# Patient Record
Sex: MALE | Race: BLACK OR AFRICAN AMERICAN | NOT HISPANIC OR LATINO | Employment: UNEMPLOYED | URBAN - METROPOLITAN AREA
[De-identification: names, ages, dates, MRNs, and addresses within clinical notes are randomized per-mention and may not be internally consistent; named-entity substitution may affect disease eponyms.]

---
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
## 2021-07-12 ENCOUNTER — NURSE TRIAGE (OUTPATIENT)
Dept: OTHER | Facility: OTHER | Age: 21
End: 2021-07-12

## 2021-07-12 DIAGNOSIS — Z20.822 ENCOUNTER FOR SCREENING LABORATORY TESTING FOR COVID-19 VIRUS: Primary | ICD-10-CM

## 2021-07-12 NOTE — TELEPHONE ENCOUNTER
Regarding: COVID TEST FOR TRAVEL  ----- Message from Deborah You sent at 7/12/2021 11:19 AM EDT -----  "I need to be tested due to travel"  Aware of the fee and requirement to create the mychart

## 2021-07-12 NOTE — TELEPHONE ENCOUNTER
Patient is requesting a COVID test for travel  Order placed  Patient informed of testing site, location, and hours of operation  Patient has an active MyChart to review results

## 2022-03-15 ENCOUNTER — HOSPITAL ENCOUNTER (EMERGENCY)
Facility: HOSPITAL | Age: 22
Discharge: HOME | End: 2022-03-15
Attending: EMERGENCY MEDICINE | Admitting: EMERGENCY MEDICINE
Payer: COMMERCIAL

## 2022-03-15 ENCOUNTER — APPOINTMENT (EMERGENCY)
Dept: RADIOLOGY | Facility: HOSPITAL | Age: 22
End: 2022-03-15
Payer: COMMERCIAL

## 2022-03-15 VITALS
RESPIRATION RATE: 20 BRPM | TEMPERATURE: 98.1 F | OXYGEN SATURATION: 100 % | HEART RATE: 101 BPM | SYSTOLIC BLOOD PRESSURE: 154 MMHG | DIASTOLIC BLOOD PRESSURE: 86 MMHG

## 2022-03-15 DIAGNOSIS — R10.9 ABDOMINAL PAIN: ICD-10-CM

## 2022-03-15 DIAGNOSIS — R42 DIZZINESS: Primary | ICD-10-CM

## 2022-03-15 LAB
ALBUMIN SERPL BCP-MCNC: 4.5 G/DL (ref 3.5–5)
ALP SERPL-CCNC: 82 U/L (ref 34–104)
ALT SERPL W P-5'-P-CCNC: 87 U/L (ref 7–52)
ANION GAP SERPL CALCULATED.3IONS-SCNC: 6 MMOL/L (ref 4–13)
AST SERPL W P-5'-P-CCNC: 57 U/L (ref 13–39)
ATRIAL RATE: 109 BPM
BASOPHILS # BLD AUTO: 0.04 THOUSANDS/ΜL (ref 0–0.1)
BASOPHILS NFR BLD AUTO: 1 % (ref 0–1)
BILIRUB SERPL-MCNC: 0.5 MG/DL (ref 0.2–1)
BNP SERPL-MCNC: 17 PG/ML (ref 1–100)
BUN SERPL-MCNC: 13 MG/DL (ref 5–25)
CALCIUM SERPL-MCNC: 9.6 MG/DL (ref 8.4–10.2)
CHLORIDE SERPL-SCNC: 102 MMOL/L (ref 96–108)
CO2 SERPL-SCNC: 28 MMOL/L (ref 21–32)
CREAT SERPL-MCNC: 0.89 MG/DL (ref 0.6–1.3)
D DIMER PPP FEU-MCNC: <0.27 UG/ML FEU
EOSINOPHIL # BLD AUTO: 0.14 THOUSAND/ΜL (ref 0–0.61)
EOSINOPHIL NFR BLD AUTO: 2 % (ref 0–6)
ERYTHROCYTE [DISTWIDTH] IN BLOOD BY AUTOMATED COUNT: 12 % (ref 11.6–15.1)
GFR SERPL CREATININE-BSD FRML MDRD: 122 ML/MIN/1.73SQ M
GLUCOSE SERPL-MCNC: 112 MG/DL (ref 65–140)
HCT VFR BLD AUTO: 43.8 % (ref 36.5–49.3)
HGB BLD-MCNC: 13.9 G/DL (ref 12–17)
IMM GRANULOCYTES # BLD AUTO: 0.01 THOUSAND/UL (ref 0–0.2)
IMM GRANULOCYTES NFR BLD AUTO: 0 % (ref 0–2)
LIPASE SERPL-CCNC: 14 U/L (ref 11–82)
LYMPHOCYTES # BLD AUTO: 3.31 THOUSANDS/ΜL (ref 0.6–4.47)
LYMPHOCYTES NFR BLD AUTO: 58 % (ref 14–44)
MCH RBC QN AUTO: 29 PG (ref 26.8–34.3)
MCHC RBC AUTO-ENTMCNC: 31.7 G/DL (ref 31.4–37.4)
MCV RBC AUTO: 91 FL (ref 82–98)
MONOCYTES # BLD AUTO: 0.78 THOUSAND/ΜL (ref 0.17–1.22)
MONOCYTES NFR BLD AUTO: 13 % (ref 4–12)
NEUTROPHILS # BLD AUTO: 1.52 THOUSANDS/ΜL (ref 1.85–7.62)
NEUTS SEG NFR BLD AUTO: 26 % (ref 43–75)
NRBC BLD AUTO-RTO: 0 /100 WBCS
P AXIS: 57 DEGREES
PLATELET # BLD AUTO: 283 THOUSANDS/UL (ref 149–390)
PMV BLD AUTO: 9.6 FL (ref 8.9–12.7)
POTASSIUM SERPL-SCNC: 3.7 MMOL/L (ref 3.5–5.3)
PR INTERVAL: 146 MS
PROT SERPL-MCNC: 7.7 G/DL (ref 6.4–8.4)
QRS AXIS: 1 DEGREES
QRSD INTERVAL: 97 MS
QT INTERVAL: 320 MS
QTC INTERVAL: 429 MS
RBC # BLD AUTO: 4.8 MILLION/UL (ref 3.88–5.62)
SODIUM SERPL-SCNC: 136 MMOL/L (ref 135–147)
T WAVE AXIS: 16 DEGREES
VENTRICULAR RATE: 108 BPM
WBC # BLD AUTO: 5.8 THOUSAND/UL (ref 4.31–10.16)

## 2022-03-15 PROCEDURE — 83690 ASSAY OF LIPASE: CPT | Performed by: EMERGENCY MEDICINE

## 2022-03-15 PROCEDURE — 85025 COMPLETE CBC W/AUTO DIFF WBC: CPT | Performed by: EMERGENCY MEDICINE

## 2022-03-15 PROCEDURE — 71045 X-RAY EXAM CHEST 1 VIEW: CPT

## 2022-03-15 PROCEDURE — 96360 HYDRATION IV INFUSION INIT: CPT

## 2022-03-15 PROCEDURE — 36415 COLL VENOUS BLD VENIPUNCTURE: CPT | Performed by: EMERGENCY MEDICINE

## 2022-03-15 PROCEDURE — 93005 ELECTROCARDIOGRAM TRACING: CPT

## 2022-03-15 PROCEDURE — 99284 EMERGENCY DEPT VISIT MOD MDM: CPT

## 2022-03-15 PROCEDURE — 83880 ASSAY OF NATRIURETIC PEPTIDE: CPT | Performed by: EMERGENCY MEDICINE

## 2022-03-15 PROCEDURE — 85379 FIBRIN DEGRADATION QUANT: CPT | Performed by: EMERGENCY MEDICINE

## 2022-03-15 PROCEDURE — 99285 EMERGENCY DEPT VISIT HI MDM: CPT | Performed by: EMERGENCY MEDICINE

## 2022-03-15 PROCEDURE — 80053 COMPREHEN METABOLIC PANEL: CPT | Performed by: EMERGENCY MEDICINE

## 2022-03-15 PROCEDURE — 93010 ELECTROCARDIOGRAM REPORT: CPT | Performed by: INTERNAL MEDICINE

## 2022-03-15 RX ORDER — SODIUM CHLORIDE 9 MG/ML
3 INJECTION INTRAVENOUS
Status: DISCONTINUED | OUTPATIENT
Start: 2022-03-15 | End: 2022-03-15 | Stop reason: HOSPADM

## 2022-03-15 RX ADMIN — SODIUM CHLORIDE 1000 ML: 0.9 INJECTION, SOLUTION INTRAVENOUS at 01:43

## 2022-03-15 NOTE — ED PROVIDER NOTES
History  Chief Complaint   Patient presents with    Dizziness     patient reports feeling a "cramp" in right rib which then caused pt to feel lightheaded  Denies injury to area     This is a 19-year-old male presents emergency department complaining of lightheadedness and dizziness  The patient states that prior to arrival he had a sharp pain in his right abdomen  States that went away and then he felt lightheaded and dizzy  He denies chest pain or trouble breathing  He denies current pain in his abdomen  He denies nausea or vomiting  He denies fevers or chills  He denies changes in bowel or bladder habits  He denies a rash  He does state he was smoking marijuana about 2 hours prior to arrival in the emergency department  None       History reviewed  No pertinent past medical history  History reviewed  No pertinent surgical history  History reviewed  No pertinent family history  I have reviewed and agree with the history as documented  E-Cigarette/Vaping     E-Cigarette/Vaping Substances     Social History     Tobacco Use    Smoking status: Never Smoker    Smokeless tobacco: Never Used   Substance Use Topics    Alcohol use: Not Currently    Drug use: Yes     Types: Marijuana     Comment: last used 03/14/22       Review of Systems   All other systems reviewed and are negative        Physical Exam  Physical Exam  Constitutional:  Vital signs reviewed, patient appears nontoxic, no acute distress  Eyes: Pupils equal round reactive to light and accommodation, extraocular muscles intact  HEENT: trachea midline, no JVD, moist mucous membranes  Respiratory: lung sounds clear throughout, no rhonchi, no rales  Cardiovascular: regular rate rhythm, no murmurs or rubs  Abdomen: soft, nontender, nondistended, no rebound or guarding  Back: no CVA tenderness, normal inspection  Extremities: no edema, pulses equal in all 4 extremities  Neuro: awake, alert, oriented, no focal weakness  Skin: warm, dry, intact, no rashes noted  Vital Signs  ED Triage Vitals   Temperature Pulse Respirations Blood Pressure SpO2   03/15/22 0134 03/15/22 0125 03/15/22 0125 03/15/22 0125 03/15/22 0125   98 1 °F (36 7 °C) (!) 111 20 154/86 100 %      Temp Source Heart Rate Source Patient Position - Orthostatic VS BP Location FiO2 (%)   03/15/22 0134 03/15/22 0125 03/15/22 0125 03/15/22 0125 --   Oral Monitor Sitting Left arm       Pain Score       --                  Vitals:    03/15/22 0125 03/15/22 0134   BP: 154/86    Pulse: (!) 111 101   Patient Position - Orthostatic VS: Sitting          Visual Acuity      ED Medications  Medications   sodium chloride 0 9 % bolus 1,000 mL (0 mL Intravenous Stopped 3/15/22 0225)       Diagnostic Studies  Results Reviewed     Procedure Component Value Units Date/Time    B-Type Natriuretic Peptide(BNP) CA, GH, EA Campuses Only [[id number]]  (Normal) Collected: 03/15/22 0132    Lab Status: Final result Specimen: Blood from Arm, Right Updated: 03/15/22 0229     BNP 17 pg/mL     D-dimer, quantitative [[id number]]  (Normal) Collected: 03/15/22 0132    Lab Status: Final result Specimen: Blood from Arm, Right Updated: 03/15/22 0202     D-Dimer, Quant <0 27 ug/ml FEU     Lipase [[id number]]  (Normal) Collected: 03/15/22 0132    Lab Status: Final result Specimen: Blood from Arm, Right Updated: 03/15/22 0157     Lipase 14 u/L     Comprehensive metabolic panel [[id number]]  (Abnormal) Collected: 03/15/22 0132    Lab Status: Final result Specimen: Blood from Arm, Right Updated: 03/15/22 0157     Sodium 136 mmol/L      Potassium 3 7 mmol/L      Chloride 102 mmol/L      CO2 28 mmol/L      ANION GAP 6 mmol/L      BUN 13 mg/dL      Creatinine 0 89 mg/dL      Glucose 112 mg/dL      Calcium 9 6 mg/dL      AST 57 U/L      ALT 87 U/L      Alkaline Phosphatase 82 U/L      Total Protein 7 7 g/dL      Albumin 4 5 g/dL      Total Bilirubin 0 50 mg/dL      eGFR 122 ml/min/1 73sq m     Narrative:      National Kidney Disease Foundation guidelines for Chronic Kidney Disease (CKD):     Stage 1 with normal or high GFR (GFR > 90 mL/min/1 73 square meters)    Stage 2 Mild CKD (GFR = 60-89 mL/min/1 73 square meters)    Stage 3A Moderate CKD (GFR = 45-59 mL/min/1 73 square meters)    Stage 3B Moderate CKD (GFR = 30-44 mL/min/1 73 square meters)    Stage 4 Severe CKD (GFR = 15-29 mL/min/1 73 square meters)    Stage 5 End Stage CKD (GFR <15 mL/min/1 73 square meters)  Note: GFR calculation is accurate only with a steady state creatinine    CBC and differential [[id number]]  (Abnormal) Collected: 03/15/22 0132    Lab Status: Final result Specimen: Blood from Arm, Right Updated: 03/15/22 0152     WBC 5 80 Thousand/uL      RBC 4 80 Million/uL      Hemoglobin 13 9 g/dL      Hematocrit 43 8 %      MCV 91 fL      MCH 29 0 pg      MCHC 31 7 g/dL      RDW 12 0 %      MPV 9 6 fL      Platelets 750 Thousands/uL      nRBC 0 /100 WBCs      Neutrophils Relative 26 %      Immat GRANS % 0 %      Lymphocytes Relative 58 %      Monocytes Relative 13 %      Eosinophils Relative 2 %      Basophils Relative 1 %      Neutrophils Absolute 1 52 Thousands/µL      Immature Grans Absolute 0 01 Thousand/uL      Lymphocytes Absolute 3 31 Thousands/µL      Monocytes Absolute 0 78 Thousand/µL      Eosinophils Absolute 0 14 Thousand/µL      Basophils Absolute 0 04 Thousands/µL                  X-ray chest 1 view portable   ED Interpretation by Kaity Hawley DO (03/15 0201)   No pneumonia or pneumothorax                 Procedures  ECG 12 Lead Documentation Only    Date/Time: 3/15/2022 5:25 AM  Performed by: Kaity Hawley DO  Authorized by: Kaity Hawley DO     ECG reviewed by me, the ED Provider: yes    Patient location:  ED  Comments:      Sinus tachycardia, rate 108, normal TN, normal QTC, no STEMI             ED Course  ED Course as of 03/15/22 0523   Tue Mar 15, 2022   0219 The patient had a chest x-ray that was interpreted by me that shows no pneumonia or pneumothorax  SBIRT 20yo+      Most Recent Value   SBIRT (22 yo +)    In order to provide better care to our patients, we are screening all of our patients for alcohol and drug use  Would it be okay to ask you these screening questions? No Filed at: 03/15/2022 0127                    [facility]  Number of Diagnoses or Management Options  Abdominal pain  Dizziness  Diagnosis management comments: This is a 75-year-old male presented to the emergency department complaining of right side abdominal pain and dizziness  The patient was well-appearing on exam   He was not tachycardic or febrile  He had no abdominal tenderness on exam   Received IV fluids in the emergency department and had complete relief of his symptoms  While being monitored in the emergency department he had no arrhythmias  He had lab work significant for normal white count and normal electrolytes  The patient was discharged with follow-up to his primary care physician  Amount and/or Complexity of Data Reviewed  Clinical lab tests: reviewed and ordered  Tests in the radiology section of CPT®: reviewed  Independent visualization of images, tracings, or specimens: yes        Disposition  Final diagnoses:   Dizziness   Abdominal pain     Time reflects when diagnosis was documented in both MDM as applicable and the Disposition within this note     Time User Action Codes Description Comment    3/15/2022  2:21 AM Jessica Oro Add [R42] Dizziness     3/15/2022  2:22 AM Jessica Oro Add [R10 9] Abdominal pain       ED Disposition     ED Disposition Condition Date/Time Comment    Discharge Stable Tue Mar 15, 2022 [address] [facility] discharge to home/self care              Follow-up Information     Follow up With Specialties Details Why Contact Info Additional Information    Linsey Admas MD Internal Medicine Schedule an appointment as soon as possible for a visit  For a PCP appointment [address] [facility] [place] [address]  [address] [facility] Emergency Department Emergency Medicine  If symptoms worsen [address] [facility],[address] [id number]  [address] Emergency Department, [address], [address] [facility]          There are no discharge medications for this patient  No discharge procedures on file      PDMP Review     None          ED Provider  Electronically Signed by           Jakub Richrads DO  03/15/22 7302

## 2022-10-06 ENCOUNTER — OFFICE VISIT (OUTPATIENT)
Dept: FAMILY MEDICINE CLINIC | Facility: CLINIC | Age: 22
End: 2022-10-06
Payer: COMMERCIAL

## 2022-10-06 VITALS
SYSTOLIC BLOOD PRESSURE: 110 MMHG | HEIGHT: 71 IN | TEMPERATURE: 97.1 F | WEIGHT: 202 LBS | DIASTOLIC BLOOD PRESSURE: 80 MMHG | OXYGEN SATURATION: 98 % | HEART RATE: 83 BPM | BODY MASS INDEX: 28.28 KG/M2

## 2022-10-06 DIAGNOSIS — Z11.4 SCREENING FOR HIV (HUMAN IMMUNODEFICIENCY VIRUS): ICD-10-CM

## 2022-10-06 DIAGNOSIS — Z11.59 NEED FOR HEPATITIS C SCREENING TEST: ICD-10-CM

## 2022-10-06 DIAGNOSIS — Z00.00 ANNUAL PHYSICAL EXAM: Primary | ICD-10-CM

## 2022-10-06 PROCEDURE — 99385 PREV VISIT NEW AGE 18-39: CPT | Performed by: INTERNAL MEDICINE

## 2022-10-06 NOTE — ASSESSMENT & PLAN NOTE
Patient is eating healthy diet  He is exercising regularly  Physically active  According to him his vaccinations are up-to-date  He will bring vaccine card next time  He agreed to be checked for HIV screening and hep C screening  Check lipid profile CMP and CBC with diff

## 2022-10-06 NOTE — PROGRESS NOTES
ADULT ANNUAL [address], [address] PRIMARY CARE [place]    NAME: Prince Dash  AGE: 24 y o  SEX: male  : 2000     DATE: 10/6/2022     Assessment and Plan:     Problem List Items Addressed This Visit        Other    Annual physical exam - Primary     Patient is eating healthy diet  He is exercising regularly  Physically active  According to him his vaccinations are up-to-date  He will bring vaccine card next time  He agreed to be checked for HIV screening and hep C screening  Check lipid profile CMP and CBC with diff  Relevant Orders    Lipid panel    CBC and Platelet    Comprehensive metabolic panel    BMI 87 4-51 0,VCRQN      Other Visit Diagnoses     Screening for HIV (human immunodeficiency virus)        Relevant Orders    HIV 1/2 Antigen/Antibody (4th Generation) w Reflex [facility]    Need for hepatitis C screening test        Relevant Orders    Hepatitis C Antibody ([facility])          Immunizations and preventive care screenings were discussed with patient today  Appropriate education was printed on patient's after visit summary  Counseling:  · Alcohol/drug use: discussed moderation in alcohol intake, the recommendations for healthy alcohol use, and avoidance of illicit drug use  BMI Counseling: Body mass index is 28 57 kg/m²  The BMI is above normal  Nutrition recommendations include decreasing portion sizes, decreasing fast food intake, consuming healthier snacks, limiting drinks that contain sugar, moderation in carbohydrate intake and reducing intake of cholesterol  Exercise recommendations include exercising 3-5 times per week  Rationale for BMI follow-up plan is due to patient being overweight or obese  Depression Screening and Follow-up Plan: Patient was screened for depression during today's encounter  They screened negative with a PHQ-2 score of 0          Return in about 1 year (around 10/6/2023) for Annual physical  Chief Complaint:     Chief Complaint   Patient presents with    Follow-up      History of Present Illness:     Adult Annual Physical   Patient here for a comprehensive physical exam  The patient reports no problems  Diet and Physical Activity  · Diet/Nutrition: well balanced diet  · Exercise: moderate cardiovascular exercise  Depression Screening  PHQ-2/9 Depression Screening    Little interest or pleasure in doing things: 0 - not at all  Feeling down, depressed, or hopeless: 0 - not at all  PHQ-2 Score: 0  PHQ-2 Interpretation: Negative depression screen       General Health  · Sleep: sleeps well  · Hearing: normal - bilateral   · Vision: no vision problems  · Dental: regular dental visits  [facility]  · History of STDs?: no      Review of Systems:     Review of Systems   Constitutional: Negative for chills, fatigue and fever  HENT: Negative for congestion, nosebleeds and rhinorrhea  Eyes: Negative for discharge and visual disturbance  Respiratory: Negative for cough, chest tightness, shortness of breath and wheezing  Cardiovascular: Negative for chest pain  Gastrointestinal: Negative for abdominal distention, abdominal pain, constipation, diarrhea and vomiting  Endocrine: Negative for polydipsia and polyuria  Genitourinary: Negative for difficulty urinating, frequency and hematuria  Musculoskeletal: Negative for arthralgias, back pain and myalgias  Skin: Negative for rash  Allergic/Immunologic: Negative for environmental allergies  Neurological: Negative for dizziness, syncope, weakness, light-headedness and headaches  Hematological: Negative  Psychiatric/Behavioral: Negative for agitation, confusion, decreased concentration, dysphoric mood and suicidal ideas  The patient is not nervous/anxious  All other systems reviewed and are negative  Past Medical History:     History reviewed  No pertinent past medical history     Past Surgical History:     History reviewed  No pertinent surgical history  Social History:     Social History     Socioeconomic History    Marital status: Single     Spouse name: None    Number of children: None    Years of education: None    Highest education level: None   Occupational History    None   Tobacco Use    Smoking status: Never Smoker    Smokeless tobacco: Never Used   Substance and Sexual Activity    Alcohol use: Not Currently    Drug use: Yes     Types: Marijuana     Comment: last used 03/14/22    Sexual activity: None   Other Topics Concern    None   Social History Narrative    None     Social Determinants of Health     Financial Resource Strain: Not on file   Food Insecurity: Not on file   Transportation Needs: Not on file   Physical Activity: Not on file   Stress: Not on file   Social Connections: Not on file   Intimate Partner Violence: Not on file   Housing Stability: Not on file      Family History:     History reviewed  No pertinent family history  Current Medications:     No current outpatient medications on file  No current facility-administered medications for this visit  Allergies:     No Known Allergies   Physical Exam:     /80 (BP Location: Right arm, Patient Position: Sitting, Cuff Size: Standard)   Pulse 83   Temp (!) 97 1 °F (36 2 °C) (Temporal)   Ht 5' 10 5" (1 791 m)   Wt 91 6 kg (202 lb)   SpO2 98%   BMI 28 57 kg/m²     Physical Exam  Vitals and nursing note reviewed  Constitutional:       Appearance: Normal appearance  He is well-developed  He is not ill-appearing  HENT:      Head: Normocephalic and atraumatic  Eyes:      Conjunctiva/sclera: Conjunctivae normal    Cardiovascular:      Rate and Rhythm: Normal rate and regular rhythm  Pulses: Normal pulses  Heart sounds: Normal heart sounds  No murmur heard  Pulmonary:      Effort: Pulmonary effort is normal  No respiratory distress  Breath sounds: Normal breath sounds     Abdominal:      General: Bowel sounds are normal  There is no distension  Palpations: Abdomen is soft  There is no mass  Tenderness: There is no abdominal tenderness  Musculoskeletal:      Cervical back: Neck supple  Right lower leg: No edema  Left lower leg: No edema  Skin:     General: Skin is warm and dry  Neurological:      Mental Status: He is alert and oriented to person, place, and time  Psychiatric:         Mood and Affect: Mood normal          Behavior: Behavior normal          Thought Content:  Thought content normal           Jayne Real MD   [facility] PRIMARY CARE [facility]

## 2022-10-06 NOTE — PATIENT INSTRUCTIONS

## 2023-12-25 ENCOUNTER — HOSPITAL ENCOUNTER (EMERGENCY)
Facility: HOSPITAL | Age: 23
Discharge: HOME | End: 2023-12-26
Attending: EMERGENCY MEDICINE
Payer: COMMERCIAL

## 2023-12-25 ENCOUNTER — APPOINTMENT (EMERGENCY)
Dept: CT IMAGING | Facility: HOSPITAL | Age: 23
End: 2023-12-25
Payer: COMMERCIAL

## 2023-12-25 VITALS
HEART RATE: 77 BPM | RESPIRATION RATE: 17 BRPM | DIASTOLIC BLOOD PRESSURE: 70 MMHG | TEMPERATURE: 98.5 F | OXYGEN SATURATION: 98 % | SYSTOLIC BLOOD PRESSURE: 131 MMHG

## 2023-12-25 DIAGNOSIS — H57.12 LEFT EYE PAIN: Primary | ICD-10-CM

## 2023-12-25 DIAGNOSIS — G43.109 OCULAR MIGRAINE: ICD-10-CM

## 2023-12-25 PROCEDURE — 99284 EMERGENCY DEPT VISIT MOD MDM: CPT | Performed by: EMERGENCY MEDICINE

## 2023-12-25 PROCEDURE — 99283 EMERGENCY DEPT VISIT LOW MDM: CPT

## 2023-12-25 PROCEDURE — 96372 THER/PROPH/DIAG INJ SC/IM: CPT

## 2023-12-25 PROCEDURE — G1004 CDSM NDSC: HCPCS

## 2023-12-25 PROCEDURE — 70480 CT ORBIT/EAR/FOSSA W/O DYE: CPT

## 2023-12-25 RX ORDER — TETRACAINE HYDROCHLORIDE 5 MG/ML
1 SOLUTION OPHTHALMIC ONCE
Status: COMPLETED | OUTPATIENT
Start: 2023-12-25 | End: 2023-12-25

## 2023-12-25 RX ORDER — KETOROLAC TROMETHAMINE 30 MG/ML
30 INJECTION, SOLUTION INTRAMUSCULAR; INTRAVENOUS ONCE
Status: COMPLETED | OUTPATIENT
Start: 2023-12-25 | End: 2023-12-25

## 2023-12-25 RX ADMIN — TETRACAINE HYDROCHLORIDE 1 DROP: 5 SOLUTION OPHTHALMIC at 23:24

## 2023-12-25 RX ADMIN — FLUORESCEIN SODIUM 1 STRIP: 1 STRIP OPHTHALMIC at 23:24

## 2023-12-25 RX ADMIN — KETOROLAC TROMETHAMINE 30 MG: 30 INJECTION, SOLUTION INTRAMUSCULAR at 23:21

## 2023-12-26 NOTE — ED PROVIDER NOTES
"History  Chief Complaint   Patient presents with    Eye Pain     Intermittent L eye pain x 1 week, worse today, \"squeezing\", denies vision changes     HPI    None       History reviewed. No pertinent past medical history.    History reviewed. No pertinent surgical history.    History reviewed. No pertinent family history.  I have reviewed and agree with the history as documented.    E-Cigarette/Vaping    E-Cigarette Use Never User      E-Cigarette/Vaping Substances     Social History     Tobacco Use    Smoking status: Never     Passive exposure: Never    Smokeless tobacco: Never   Vaping Use    Vaping status: Never Used   Substance Use Topics    Alcohol use: Not Currently    Drug use: Yes     Types: Marijuana     Comment: last used 03/14/22       Review of Systems    Physical Exam  Physical Exam    Vital Signs  ED Triage Vitals   Temperature Pulse Respirations Blood Pressure SpO2   12/25/23 2242 12/25/23 2242 12/25/23 2242 12/25/23 2242 12/25/23 2242   98.5 °F (36.9 °C) 77 17 131/70 98 %      Temp Source Heart Rate Source Patient Position - Orthostatic VS BP Location FiO2 (%)   12/25/23 2242 12/25/23 2242 12/25/23 2242 12/25/23 2242 --   Oral Monitor Lying Right arm       Pain Score       12/25/23 2321       10 - Worst Possible Pain           Vitals:    12/25/23 2242   BP: 131/70   Pulse: 77   Patient Position - Orthostatic VS: Lying         Visual Acuity  Visual Acuity      Flowsheet Row Most Recent Value   Visual acuity R eye is --  [20/10]   Visual acuity Left eye is --  [20/13]   Visual acuity in both eyes is --  [20/10]   Wearing corrective eyewear/lenses? No   No corrective eyewear/lenses Yes            ED Medications  Medications   fluorescein sodium sterile ophthalmic strip 1 strip (1 strip Left Eye Given 12/25/23 2324)   tetracaine 0.5 % ophthalmic solution 1 drop (1 drop Left Eye Given 12/25/23 2324)   ketorolac (TORADOL) injection 30 mg (30 mg Intramuscular Given 12/25/23 2321)       Diagnostic " Studies  Results Reviewed       None                   CT orbits/temporal bones/skull base wo contrast    (Results Pending)              Procedures  Procedures         ED Course  ED Course as of 12/26/23 0228   Tue Dec 26, 2023   0118 CT orbits/temporal bones/skull base wo contrast  Per [facility] report, scattered sinus mucosal disease, otherwise unremarkable study.                               SBIRT 20yo+      Flowsheet Row Most Recent Value   Initial Alcohol Screen: US AUDIT-C     1. How often do you have a drink containing alcohol? 0 Filed at: 12/25/2023 2343   2. How many drinks containing alcohol do you have on a typical day you are drinking?  0 Filed at: 12/25/2023 2343   3a. Male UNDER 65: How often do you have five or more drinks on one occasion? 0 Filed at: 12/25/2023 2343   3b. FEMALE Any Age, or MALE 65+: How often do you have 4 or more drinks on one occassion? 0 Filed at: 12/25/2023 2343   Audit-C Score 0 Filed at: 12/25/2023 2343   LORRI: How many times in the past year have you...    Used an illegal drug or used a prescription medication for non-medical reasons? Never Filed at: 12/25/2023 2343                      Medical Decision Making  Amount and/or Complexity of Data Reviewed  Radiology: ordered. Decision-making details documented in ED Course.    Risk  Prescription drug management.             Disposition  Final diagnoses:   Left eye pain   Ocular migraine     Time reflects when diagnosis was documented in both MDM as applicable and the Disposition within this note       Time User Action Codes Description Comment    12/26/2023  2:26 AM Horacio Verdin Add [H57.12] Left eye pain     12/26/2023  2:27 AM Horacio Verdin Add [G43.109] Ocular migraine           ED Disposition       ED Disposition   Discharge    Condition   Stable    Date/Time   Tue Dec 26, 2023 0226    Comment   Sarah Anthony Pa discharge to home/self care.                   Follow-up Information       Follow up With Specialties Details Why  Contact Info Additional Information    [facility] Family Medicine Call in 1 week For follow-up [address] [place] [id number] [phone] [facility], [address] [place] [address], [address] [phone]    [facility] Emergency Department Emergency Medicine Go to  If symptoms worsen [address] [address] [phone] [facility] Emergency Department, [address] [address]            Patient's Medications    No medications on file       No discharge procedures on file.    PDMP Review       None            ED Provider  Electronically Signed by           "containing alcohol? 0 Filed at: 12/25/2023 2343   2. How many drinks containing alcohol do you have on a typical day you are drinking?  0 Filed at: 12/25/2023 2343   3a. Male UNDER 65: How often do you have five or more drinks on one occasion? 0 Filed at: 12/25/2023 2343   3b. FEMALE Any Age, or MALE 65+: How often do you have 4 or more drinks on one occassion? 0 Filed at: 12/25/2023 2343   Audit-C Score 0 Filed at: 12/25/2023 2343   LORRI: How many times in the past year have you...    Used an illegal drug or used a prescription medication for non-medical reasons? Never Filed at: 12/25/2023 2343                      Medical Decision Making  23-year-old male presents to the ED for evaluation of intermittent left eye pain and headache over the last week, worsening over the last day.  The patient describes his symptoms as a \"squeezing discomfort\" behind his left eye and on the left side of his head.  The patient has experienced similar symptoms in the past but never this severe.  He denies any trauma to his head or his eyes.  No foreign body sensation reported.  He denies any visual changes, fever, chills, eye redness, photophobia, discharge, numbness, or weakness.  He does not wear corrective glasses or contact lenses.    Vital signs reviewed.  Visual acuity OD 20/10, OS 20/13, bilateral 20/10.  Normal extraocular movements.  Normal conjunctiva.  Pupils are equal, round, and reactive to light.  Fluorescein administered with no dye uptake or corneal abrasion.  No foreign bodies visualized, no foreign bodies visible on lid eversion. See physical exam documentation for exam findings.  Differential diagnosis includes but is not limited to ocular migraines vs less likely retrobulbar mass/hematoma.  Will treat symptomatically and obtain CT orbits.  CT imaging with no acute abnormalities with the exception of mild sinus disease.  Symptoms appear consistent with ocular migraine given associated with headache. I discussed all " findings, treatment, red flags/return precautions, and outpatient follow-up and the patient/family understand and agree. Stable for discharge.    Amount and/or Complexity of Data Reviewed  Radiology: ordered. Decision-making details documented in ED Course.    Risk  Prescription drug management.             Disposition  Final diagnoses:   Left eye pain   Ocular migraine     Time reflects when diagnosis was documented in both MDM as applicable and the Disposition within this note       Time User Action Codes Description Comment    12/26/2023  2:26 AM Horacio Verdin [H57.12] Left eye pain     12/26/2023  2:27 AM Horacio Verdin [G43.109] Ocular migraine           ED Disposition       ED Disposition   Discharge    Condition   Stable    Date/Time   Tue Dec 26, 2023 0226    Comment   Sarah Pa discharge to home/self care.                   Follow-up Information       Follow up With Specialties Details Why Contact Info Additional Information    [facility] Family Medicine Call in 1 week For follow-up [address] [place] [id number] [phone] [facility], [address] [place] [address], [phone]   [phone]    [facility] Emergency Department Emergency Medicine Go to  If symptoms worsen [address] [place] [id number]  [phone] [facility] Emergency Department, [address] [address]            There are no discharge medications for this patient.      No discharge procedures on file.    PDMP Review       None            ED Provider  Electronically Signed by             Horacio Verdin MD  01/08/24 0829

## 2023-12-26 NOTE — ED NOTES
Discharge instructions reviewed with pt. Pt verbalized understanding. And has no further questions at this time. Pt ambulatory off unit with steady gait.      Rachell Sousa RN  12/26/23 0544     Statement Selected